# Patient Record
Sex: FEMALE | Race: WHITE
[De-identification: names, ages, dates, MRNs, and addresses within clinical notes are randomized per-mention and may not be internally consistent; named-entity substitution may affect disease eponyms.]

---

## 2021-02-18 ENCOUNTER — HOSPITAL ENCOUNTER (OUTPATIENT)
Dept: HOSPITAL 7 - FB.SDS | Age: 46
Discharge: HOME | End: 2021-02-18
Attending: SURGERY
Payer: COMMERCIAL

## 2021-02-18 DIAGNOSIS — Z98.84: ICD-10-CM

## 2021-02-18 DIAGNOSIS — G47.9: ICD-10-CM

## 2021-02-18 DIAGNOSIS — Z98.890: ICD-10-CM

## 2021-02-18 DIAGNOSIS — L20.82: ICD-10-CM

## 2021-02-18 DIAGNOSIS — E66.01: ICD-10-CM

## 2021-02-18 DIAGNOSIS — Z12.11: Primary | ICD-10-CM

## 2021-02-18 DIAGNOSIS — Z90.49: ICD-10-CM

## 2021-02-18 DIAGNOSIS — Z79.899: ICD-10-CM

## 2021-02-18 DIAGNOSIS — Z80.0: ICD-10-CM

## 2021-02-18 DIAGNOSIS — Z83.71: ICD-10-CM

## 2021-02-18 NOTE — OR
DATE OF OPERATION:  02/18/2021

 

SURGEON:  Rolan Adler MD

 

PROCEDURE PERFORMED:  Colonoscopy.

 

PREOPERATIVE DIAGNOSIS:  Family history of colon polyps as well as a family

history of colon cancer with her grandfather.

 

INDICATIONS FOR PROCEDURE:  This is a 45-year-old white female, whose last

colonoscopy was 10 years ago, and it was recommended that she have a repeat

scope in 10 years due to her family history.  She presents now for this

colonoscopy.

 

DESCRIPTION OF PROCEDURE:  After an excellent IV sedation was administered,

digital rectal exam was performed.  No marked abnormality was noted.  The

flexible colonoscope was inserted and advanced without difficulty to the

patient's cecum.  The prep was excellent.  The following findings were noted:

In the cecum, she has a rather prominent appendiceal orifice.  The scope could

actually be inserted into the mouth of the appendix.  However, the ileocecal

valve was easily identified as well.  Photos were taken for the record.  No

other marked abnormality was noted.  The remainder of the ascending colon was

unremarkable.  The transverse colon was unremarkable.  The descending colon was

unremarkable.  The sigmoid and rectum were unremarkable.  The colon was

deflated.  The scope was removed.

 

RECOMMENDATIONS:  Repeat colonoscopy in 10 years or sooner on a p.r.n. basis.

 

Job#: [id number]

DD: 02/18/2021 0909

DT: 02/18/2021 0927 AS/MODL

## 2021-02-18 NOTE — PCM.OPNOTE
- General Post-Op/Procedure Note


Date of Surgery/Procedure: 02/18/21


Operative Procedure(s): c scope


Findings: 





no polyps. 


does have a very prominent appendiceal orifice 


Pre Op Diagnosis: family hx of colon polyps


Post-Op Diagnosis: nl c scope


Anesthesia Technique: MAC


Primary Surgeon: Rolan Adler


Anesthesia Provider: Mickey Gonsales


Pathology: 





none


submitted


Complications: None


Condition: Good


Free Text/Narrative:: 


see dictation #[id number]